# Patient Record
Sex: FEMALE | Race: WHITE | URBAN - METROPOLITAN AREA
[De-identification: names, ages, dates, MRNs, and addresses within clinical notes are randomized per-mention and may not be internally consistent; named-entity substitution may affect disease eponyms.]

---

## 2017-09-28 ENCOUNTER — OUTPATIENT (OUTPATIENT)
Dept: OUTPATIENT SERVICES | Facility: HOSPITAL | Age: 52
LOS: 1 days | Discharge: HOME | End: 2017-09-28

## 2017-09-28 DIAGNOSIS — Z12.31 ENCOUNTER FOR SCREENING MAMMOGRAM FOR MALIGNANT NEOPLASM OF BREAST: ICD-10-CM

## 2019-01-14 ENCOUNTER — OUTPATIENT (OUTPATIENT)
Dept: OUTPATIENT SERVICES | Facility: HOSPITAL | Age: 54
LOS: 1 days | Discharge: HOME | End: 2019-01-14

## 2019-01-14 DIAGNOSIS — Z12.31 ENCOUNTER FOR SCREENING MAMMOGRAM FOR MALIGNANT NEOPLASM OF BREAST: ICD-10-CM

## 2019-01-15 DIAGNOSIS — Z13.820 ENCOUNTER FOR SCREENING FOR OSTEOPOROSIS: ICD-10-CM

## 2019-01-15 DIAGNOSIS — N95.9 UNSPECIFIED MENOPAUSAL AND PERIMENOPAUSAL DISORDER: ICD-10-CM

## 2020-02-17 ENCOUNTER — EMERGENCY (EMERGENCY)
Facility: HOSPITAL | Age: 55
LOS: 0 days | Discharge: HOME | End: 2020-02-17
Attending: EMERGENCY MEDICINE | Admitting: EMERGENCY MEDICINE
Payer: COMMERCIAL

## 2020-02-17 VITALS
HEIGHT: 64 IN | SYSTOLIC BLOOD PRESSURE: 177 MMHG | TEMPERATURE: 98 F | RESPIRATION RATE: 18 BRPM | HEART RATE: 91 BPM | DIASTOLIC BLOOD PRESSURE: 91 MMHG | WEIGHT: 143.96 LBS | OXYGEN SATURATION: 99 %
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
## 2020-02-17 DIAGNOSIS — T16.1XXA FOREIGN BODY IN RIGHT EAR, INITIAL ENCOUNTER: ICD-10-CM

## 2020-02-17 DIAGNOSIS — T16.9XXA FOREIGN BODY IN EAR, UNSPECIFIED EAR, INITIAL ENCOUNTER: ICD-10-CM
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
## 2020-02-17 PROCEDURE — 99283 EMERGENCY DEPT VISIT LOW MDM: CPT

## 2020-02-17 RX ORDER — CIPROFLOXACIN/HYDROCORTISONE 0.2 %-1 %
3 SUSPENSION, DROPS(FINAL DOSAGE FORM)(ML) OTIC (EAR)
Qty: 1 | Refills: 0
Start: 2020-02-17 | End: 2020-02-23

## 2020-02-17 NOTE — ED ADULT TRIAGE NOTE - CHIEF COMPLAINT QUOTE
Patient states "I have a tissue stuck in my right ear from a few weeks ago doctor could not get it out".

## 2020-02-17 NOTE — ED PROVIDER NOTE - PHYSICAL EXAMINATION
--EXAM--  VITAL SIGNS: I have reviewed vs documented at present.  CONSTITUTIONAL: Well-developed; well-nourished; in no acute distress.   SKIN: Warm and dry, no acute rash.   HEAD: Normocephalic; atraumatic.  EYES: PERRL, EOM intact; conjunctiva and sclera clear. No nystagmus.  ENT: No nasal discharge; airway clear.  right ear foreign body seen can not see tm   NECK: Supple; non tender.  CARD: S1, S2, Regular rate and rhythm.   RESP: No wheezes, rales or rhonchi.  ABD: Normal bowel sounds; soft; non-distended; non-tender.  EXT: Normal ROM.   NEURO: Alert, oriented, grossly unremarkable. Strength 5/5 in all extremities. Sensation intact throughout.  PSYCH: Cooperative, appropriate.

## 2020-02-17 NOTE — ED PROVIDER NOTE - NS ED ROS FT
Review of Systems:  	•	CONSTITUTIONAL - no fever, no diaphoresis, no chills  	•	SKIN - no rash  	•	HEMATOLOGIC - no bleeding, no bruising  	•	EYES - no eye pain, no blurry vision  	•	ENT - no change in hearing, no sore throat, right  ear clogged  	•	RESPIRATORY - no shortness of breath, no cough  	•	CARDIAC - no chest pain, no palpitations  	  	•	MUSCULOSKELETAL - no joint paint, no swelling, no redness  	•	NEUROLOGIC - no weakness, no headache, no paresthesias, no LOC  	•	PSYCH - no anxiety, non suicidal, non homicidal, no hallucination, no depression

## 2020-02-17 NOTE — ED PROVIDER NOTE - CARE PROVIDER_API CALL
Mati Sam)  Otolaryngology  [address] [place] [address], [address] [place] [address]  Phone: [phone]  Fax: [phone]  Follow Up Time:

## 2020-02-17 NOTE — ED ADULT NURSE NOTE - NS_SISCREENINGSR_GEN_ALL_ED
[facility] Department    [address] [address]    Phone:  [phone]    Fax:  [phone]                                       Jenny Avina   MRN: [id number]    Department:  [facility]   Date of Visit:  11/3/2018           After Visit Summary Signature Page     I have received my discharge instructions, and my questions have been answered. I have discussed any challenges I see with this plan with the nurse or doctor.    ..........................................................................................................................................  Patient/Patient Representative Signature      ..........................................................................................................................................  Patient Representative Print Name and Relationship to Patient    ..................................................               ................................................  Date                                   Time    ..........................................................................................................................................  Reviewed by Signature/Title    ...................................................              ..............................................  Date                                               Time          22EPIC Rev 08/18         Negative

## 2020-02-17 NOTE — ED PROVIDER NOTE - PATIENT PORTAL LINK FT
You can access the FollowMyHealth Patient Portal offered by [facility] by registering at the following website: [email or web address]. By joining [employer]’s FollowMyHealth portal, you will also be able to view your health information using other applications (apps) compatible with our system.

## 2020-02-17 NOTE — ED PROVIDER NOTE - CLINICAL SUMMARY MEDICAL DECISION MAKING FREE TEXT BOX
54yF p/w FB to R ear, extracted at bedside, but canal irritation and ?TM would benefit from abx gtt.  Will prescribe abx and refer to ENT for f/u.  Return precautions reviewed with pt prior to dc.

## 2020-02-17 NOTE — ED PROVIDER NOTE - ATTENDING CONTRIBUTION TO CARE
54yF p/w FB to her R ear - pt c/o clogged feeling to her R ear for days to weeks and went to her PCP for evaluation. She admits to plugging her ears w/ tissues a while ago as makeshift ear plugs, so when her ear was persistently clogged, she tried to pick her ear to remove any remaining tissue.  Her PCP noted multiple pieces of whitish FB, but was unable to extract them 2/2 depth and sig cerumen.  He did note that she already had some irritation/bleeding from the wall of external canal and referred her to the ED rather than try further.  No fevers, ear pain or drainage.

## 2020-02-17 NOTE — ED PROVIDER NOTE - OBJECTIVE STATEMENT
this is 53 yo complaining if foreign body right ear. patient states she put something in her right ear to block noise. patient states she went to her pmd today because her ear was clogged. patient states pmd saw foreign body and was trying to get it out but was not successful. patient was sent to ed

## 2020-02-19 ENCOUNTER — APPOINTMENT (OUTPATIENT)
Dept: OTOLARYNGOLOGY | Facility: CLINIC | Age: 55
End: 2020-02-19
Payer: COMMERCIAL

## 2020-02-19 VITALS — BODY MASS INDEX: 24.24 KG/M2 | WEIGHT: 142 LBS | HEIGHT: 64 IN

## 2020-02-19 DIAGNOSIS — R09.81 NASAL CONGESTION: ICD-10-CM

## 2020-02-19 DIAGNOSIS — Z78.9 OTHER SPECIFIED HEALTH STATUS: ICD-10-CM

## 2020-02-19 DIAGNOSIS — H61.22 IMPACTED CERUMEN, LEFT EAR: ICD-10-CM

## 2020-02-19 DIAGNOSIS — T16.1XXA FOREIGN BODY IN RIGHT EAR, INITIAL ENCOUNTER: ICD-10-CM

## 2020-02-19 PROBLEM — Z00.00 ENCOUNTER FOR PREVENTIVE HEALTH EXAMINATION: Status: ACTIVE | Noted: 2020-02-19

## 2020-02-19 PROCEDURE — 69200 CLEAR OUTER EAR CANAL: CPT | Mod: RT

## 2020-02-19 PROCEDURE — 99204 OFFICE O/P NEW MOD 45 MIN: CPT | Mod: 25

## 2020-02-19 PROCEDURE — 69210 REMOVE IMPACTED EAR WAX UNI: CPT | Mod: 59

## 2020-02-19 RX ORDER — METHYLPREDNISOLONE 4 MG/1
4 TABLET ORAL
Qty: 1 | Refills: 0 | Status: ACTIVE | COMMUNITY
Start: 2020-02-19 | End: 1900-01-01

## 2020-02-19 RX ORDER — LEVOCETIRIZINE DIHYDROCHLORIDE 5 MG/1
5 TABLET ORAL DAILY
Qty: 30 | Refills: 1 | Status: ACTIVE | COMMUNITY
Start: 2020-02-19 | End: 1900-01-01

## 2020-02-19 NOTE — PHYSICAL EXAM
[de-identified] : right tissue, left cerumen  [Midline] : trachea located in midline position [Normal] : no rashes

## 2020-02-19 NOTE — HISTORY OF PRESENT ILLNESS
[de-identified] : Patient here today due to foreign body in right ear. Patient was seen in the ER Monday night. She was told most of it was out but she now c/o hearing loss in her  right ear. She states she stuck tissues in her ears about 2 weeks ago due to loud noises. Pt was given drops. \par \par Pt has facial, eye and nasal pressure. Pt has frontal headaches and congestion. Pt feels like in a tunnel. Pt has been on a nasal spray for two days. Pt has used claritin with minimal improvement.

## 2020-03-04 PROBLEM — Z78.9 OTHER SPECIFIED HEALTH STATUS: Chronic | Status: ACTIVE | Noted: 2020-02-17

## 2020-03-31 ENCOUNTER — APPOINTMENT (OUTPATIENT)
Dept: UROLOGY | Facility: CLINIC | Age: 55
End: 2020-03-31
Payer: COMMERCIAL

## 2020-03-31 VITALS
DIASTOLIC BLOOD PRESSURE: 89 MMHG | TEMPERATURE: 98.1 F | SYSTOLIC BLOOD PRESSURE: 138 MMHG | BODY MASS INDEX: 24.24 KG/M2 | HEART RATE: 76 BPM | WEIGHT: 142 LBS | HEIGHT: 64 IN

## 2020-03-31 PROCEDURE — 99203 OFFICE O/P NEW LOW 30 MIN: CPT

## 2020-03-31 NOTE — REVIEW OF SYSTEMS
[Feeling Poorly] : not feeling poorly [Chest Pain] : no chest pain [Shortness Of Breath] : no shortness of breath [Cough] : no cough [Constipation] : no constipation [Diarrhea] : no diarrhea [Confused] : no confusion

## 2020-03-31 NOTE — PHYSICAL EXAM
[General Appearance - In No Acute Distress] : no acute distress [Edema] : no peripheral edema [] : no respiratory distress [Abdomen Soft] : soft [Abdomen Tenderness] : non-tender [Costovertebral Angle Tenderness] : no ~M costovertebral angle tenderness [Normal Station and Gait] : the gait and station were normal for the patient's age [Oriented To Time, Place, And Person] : oriented to person, place, and time

## 2020-04-01 LAB — URINE CYTOLOGY: NORMAL

## 2020-04-02 ENCOUNTER — RESULT REVIEW (OUTPATIENT)
Age: 55
End: 2020-04-02

## 2020-04-02 ENCOUNTER — OUTPATIENT (OUTPATIENT)
Dept: OUTPATIENT SERVICES | Facility: HOSPITAL | Age: 55
LOS: 1 days | Discharge: HOME | End: 2020-04-02
Payer: COMMERCIAL

## 2020-04-02 DIAGNOSIS — R31.29 OTHER MICROSCOPIC HEMATURIA: ICD-10-CM

## 2020-04-02 PROCEDURE — 74178 CT ABD&PLV WO CNTR FLWD CNTR: CPT | Mod: 26

## 2020-04-03 ENCOUNTER — RECORD ABSTRACTING (OUTPATIENT)
Age: 55
End: 2020-04-03

## 2020-06-24 ENCOUNTER — OUTPATIENT (OUTPATIENT)
Dept: OUTPATIENT SERVICES | Facility: HOSPITAL | Age: 55
LOS: 1 days | Discharge: HOME | End: 2020-06-24
Payer: COMMERCIAL

## 2020-06-24 DIAGNOSIS — Z12.31 ENCOUNTER FOR SCREENING MAMMOGRAM FOR MALIGNANT NEOPLASM OF BREAST: ICD-10-CM

## 2020-06-24 PROCEDURE — 77063 BREAST TOMOSYNTHESIS BI: CPT | Mod: 26

## 2020-06-24 PROCEDURE — 77067 SCR MAMMO BI INCL CAD: CPT | Mod: 26

## 2020-06-30 ENCOUNTER — APPOINTMENT (OUTPATIENT)
Dept: UROLOGY | Facility: CLINIC | Age: 55
End: 2020-06-30
Payer: COMMERCIAL

## 2020-06-30 VITALS — WEIGHT: 142 LBS | TEMPERATURE: 98.2 F | HEIGHT: 64 IN | BODY MASS INDEX: 24.24 KG/M2

## 2020-06-30 PROCEDURE — 52000 CYSTOURETHROSCOPY: CPT

## 2020-10-06 ENCOUNTER — APPOINTMENT (OUTPATIENT)
Dept: UROLOGY | Facility: CLINIC | Age: 55
End: 2020-10-06
Payer: COMMERCIAL

## 2020-10-06 DIAGNOSIS — R31.29 OTHER MICROSCOPIC HEMATURIA: ICD-10-CM

## 2020-10-06 LAB
BILIRUB UR QL STRIP: NORMAL
COLLECTION METHOD: NORMAL
GLUCOSE UR-MCNC: NORMAL
HCG UR QL: NORMAL EU/DL
HGB UR QL STRIP.AUTO: NORMAL
KETONES UR-MCNC: NORMAL
LEUKOCYTE ESTERASE UR QL STRIP: NORMAL
NITRITE UR QL STRIP: NORMAL
PH UR STRIP: NORMAL
PROT UR STRIP-MCNC: NORMAL
SP GR UR STRIP: 1

## 2020-10-06 PROCEDURE — 99213 OFFICE O/P EST LOW 20 MIN: CPT

## 2020-10-06 PROCEDURE — 81003 URINALYSIS AUTO W/O SCOPE: CPT | Mod: QW

## 2020-10-06 NOTE — HISTORY OF PRESENT ILLNESS
[FreeTextEntry1] : History of microscopic hematuria status post benign workup. Today's urinalysis is negative for red blood cells.  no urinary complaints [Urinary Incontinence] : no urinary incontinence [Urinary Retention] : no urinary retention [Urinary Urgency] : no urinary urgency [Urinary Frequency] : no urinary frequency [Dysuria] : no dysuria [Hematuria - Gross] : no gross hematuria

## 2020-10-06 NOTE — REVIEW OF SYSTEMS
[Feeling Poorly] : not feeling poorly [Feeling Tired] : not feeling tired [Chest Pain] : no chest pain [Cough] : no cough [Constipation] : no constipation [Pelvic Pain] : no pelvic pain [Confused] : no confusion

## 2020-10-06 NOTE — PHYSICAL EXAM
[General Appearance - In No Acute Distress] : no acute distress [Costovertebral Angle Tenderness] : no ~M costovertebral angle tenderness [] : no respiratory distress [Oriented To Time, Place, And Person] : oriented to person, place, and time [Normal Station and Gait] : the gait and station were normal for the patient's age

## 2021-06-17 NOTE — ED ADULT NURSE NOTE - TEMPLATE
EENMT Double Island Pedicle Flap Text: The defect edges were debeveled with a #15 scalpel blade.  Given the location of the defect, shape of the defect and the proximity to free margins a double island pedicle advancement flap was deemed most appropriate.  Using a sterile surgical marker, an appropriate advancement flap was drawn incorporating the defect, outlining the appropriate donor tissue and placing the expected incisions within the relaxed skin tension lines where possible.    The area thus outlined was incised deep to adipose tissue with a #15 scalpel blade.  The skin margins were undermined to an appropriate distance in all directions around the primary defect and laterally outward around the island pedicle utilizing iris scissors.  There was minimal undermining beneath the pedicle flap.

## 2021-07-14 ENCOUNTER — NON-APPOINTMENT (OUTPATIENT)
Age: 56
End: 2021-07-14

## 2021-08-01 ENCOUNTER — LABORATORY RESULT (OUTPATIENT)
Age: 56
End: 2021-08-01

## 2021-08-02 ENCOUNTER — APPOINTMENT (OUTPATIENT)
Dept: OBGYN | Facility: CLINIC | Age: 56
End: 2021-08-02
Payer: COMMERCIAL

## 2021-08-02 VITALS
WEIGHT: 144 LBS | BODY MASS INDEX: 24.59 KG/M2 | SYSTOLIC BLOOD PRESSURE: 135 MMHG | HEIGHT: 64 IN | DIASTOLIC BLOOD PRESSURE: 90 MMHG

## 2021-08-02 DIAGNOSIS — R82.998 OTHER ABNORMAL FINDINGS IN URINE: ICD-10-CM

## 2021-08-02 DIAGNOSIS — Z80.0 FAMILY HISTORY OF MALIGNANT NEOPLASM OF DIGESTIVE ORGANS: ICD-10-CM

## 2021-08-02 DIAGNOSIS — G43.909 MIGRAINE, UNSPECIFIED, NOT INTRACTABLE, W/OUT STATUS MIGRAINOSUS: ICD-10-CM

## 2021-08-02 DIAGNOSIS — Z80.6 FAMILY HISTORY OF LEUKEMIA: ICD-10-CM

## 2021-08-02 DIAGNOSIS — R31.21 ASYMPTOMATIC MICROSCOPIC HEMATURIA: ICD-10-CM

## 2021-08-02 DIAGNOSIS — Z01.411 ENCOUNTER FOR GYNECOLOGICAL EXAMINATION (GENERAL) (ROUTINE) WITH ABNORMAL FINDINGS: ICD-10-CM

## 2021-08-02 LAB
BILIRUB UR QL STRIP: NORMAL
CLARITY UR: NORMAL
COLLECTION METHOD: NORMAL
GLUCOSE UR-MCNC: NORMAL
HCG UR QL: 0.2 EU/DL
HGB UR QL STRIP.AUTO: NORMAL
KETONES UR-MCNC: NORMAL
LEUKOCYTE ESTERASE UR QL STRIP: NORMAL
NITRITE UR QL STRIP: NORMAL
PH UR STRIP: 5.5
PROT UR STRIP-MCNC: NORMAL
SP GR UR STRIP: 1.03

## 2021-08-02 PROCEDURE — 99396 PREV VISIT EST AGE 40-64: CPT

## 2021-08-02 PROCEDURE — 81003 URINALYSIS AUTO W/O SCOPE: CPT | Mod: QW

## 2021-08-02 NOTE — PHYSICAL EXAM
[Examination Of The Breasts] : a normal appearance [] : implants [No Masses] : no breast masses were palpable [Vulvar Atrophy] : vulvar atrophy [Labia Majora] : normal [Labia Minora] : normal [Atrophy] : atrophy [Normal] : normal [Retroversion] : retroverted [Uterine Adnexae] : normal

## 2021-08-02 NOTE — HISTORY OF PRESENT ILLNESS
[Menopause Age: ____] : age at menopause was [unfilled] [postmenopausal] : postmenopausal [N] : Patient does not use contraception [Currently In Menopause] : currently in menopause [No] : Patient does not have concerns regarding sex

## 2021-08-04 ENCOUNTER — RESULT REVIEW (OUTPATIENT)
Age: 56
End: 2021-08-04

## 2021-08-04 ENCOUNTER — OUTPATIENT (OUTPATIENT)
Dept: OUTPATIENT SERVICES | Facility: HOSPITAL | Age: 56
LOS: 1 days | Discharge: HOME | End: 2021-08-04
Payer: COMMERCIAL

## 2021-08-04 DIAGNOSIS — Z12.31 ENCOUNTER FOR SCREENING MAMMOGRAM FOR MALIGNANT NEOPLASM OF BREAST: ICD-10-CM

## 2021-08-04 LAB
C TRACH RRNA SPEC QL NAA+PROBE: NOT DETECTED
HPV HIGH+LOW RISK DNA PNL CVX: NOT DETECTED
N GONORRHOEA RRNA SPEC QL NAA+PROBE: NOT DETECTED
SOURCE AMPLIFICATION: NORMAL

## 2021-08-04 PROCEDURE — 77063 BREAST TOMOSYNTHESIS BI: CPT | Mod: 26

## 2021-08-04 PROCEDURE — 77067 SCR MAMMO BI INCL CAD: CPT | Mod: 26

## 2021-08-05 DIAGNOSIS — R92.2 INCONCLUSIVE MAMMOGRAM: ICD-10-CM

## 2021-08-05 DIAGNOSIS — R92.8 OTHER ABNORMAL AND INCONCLUSIVE FINDINGS ON DIAGNOSTIC IMAGING OF BREAST: ICD-10-CM

## 2021-08-10 ENCOUNTER — NON-APPOINTMENT (OUTPATIENT)
Age: 56
End: 2021-08-10

## 2021-08-10 DIAGNOSIS — N39.0 URINARY TRACT INFECTION, SITE NOT SPECIFIED: ICD-10-CM

## 2021-08-10 RX ORDER — CIPROFLOXACIN HYDROCHLORIDE 250 MG/1
250 TABLET, FILM COATED ORAL
Qty: 6 | Refills: 0 | Status: ACTIVE | COMMUNITY
Start: 2021-08-10 | End: 1900-01-01

## 2021-08-13 ENCOUNTER — RESULT REVIEW (OUTPATIENT)
Age: 56
End: 2021-08-13

## 2021-08-13 ENCOUNTER — OUTPATIENT (OUTPATIENT)
Dept: OUTPATIENT SERVICES | Facility: HOSPITAL | Age: 56
LOS: 1 days | Discharge: HOME | End: 2021-08-13
Payer: COMMERCIAL

## 2021-08-13 DIAGNOSIS — R92.8 OTHER ABNORMAL AND INCONCLUSIVE FINDINGS ON DIAGNOSTIC IMAGING OF BREAST: ICD-10-CM

## 2021-08-13 PROCEDURE — 77065 DX MAMMO INCL CAD UNI: CPT | Mod: 26,RT

## 2021-08-13 PROCEDURE — G0279: CPT | Mod: 26

## 2021-08-13 PROCEDURE — 76642 ULTRASOUND BREAST LIMITED: CPT | Mod: 26,RT

## 2021-08-14 LAB
APPEARANCE: ABNORMAL
BACTERIA UR CULT: ABNORMAL
BILIRUBIN URINE: NEGATIVE
BLOOD URINE: ABNORMAL
COLOR: YELLOW
CYTOLOGY CVX/VAG DOC THIN PREP: ABNORMAL
GLUCOSE QUALITATIVE U: NEGATIVE
KETONES URINE: ABNORMAL
LEUKOCYTE ESTERASE URINE: ABNORMAL
NITRITE URINE: NEGATIVE
PH URINE: 5.5
PROTEIN URINE: NORMAL
SPECIFIC GRAVITY URINE: 1.03
UROBILINOGEN URINE: NORMAL

## 2021-08-26 ENCOUNTER — RESULT REVIEW (OUTPATIENT)
Age: 56
End: 2021-08-26

## 2021-08-26 ENCOUNTER — OUTPATIENT (OUTPATIENT)
Dept: OUTPATIENT SERVICES | Facility: HOSPITAL | Age: 56
LOS: 1 days | Discharge: HOME | End: 2021-08-26
Payer: COMMERCIAL

## 2021-08-26 PROCEDURE — 88305 TISSUE EXAM BY PATHOLOGIST: CPT | Mod: 26

## 2021-08-26 PROCEDURE — 19083 BX BREAST 1ST LESION US IMAG: CPT | Mod: RT

## 2021-08-26 PROCEDURE — 77065 DX MAMMO INCL CAD UNI: CPT | Mod: 26,RT

## 2021-08-27 LAB — SURGICAL PATHOLOGY STUDY: SIGNIFICANT CHANGE UP

## 2021-08-31 DIAGNOSIS — N63.10 UNSPECIFIED LUMP IN THE RIGHT BREAST, UNSPECIFIED QUADRANT: ICD-10-CM

## 2021-08-31 DIAGNOSIS — R92.1 MAMMOGRAPHIC CALCIFICATION FOUND ON DIAGNOSTIC IMAGING OF BREAST: ICD-10-CM

## 2021-09-22 ENCOUNTER — NON-APPOINTMENT (OUTPATIENT)
Age: 56
End: 2021-09-22

## 2022-06-21 PROBLEM — Z00.00 ENCOUNTER FOR PREVENTIVE HEALTH EXAMINATION: Status: ACTIVE | Noted: 2022-06-21

## 2022-09-01 ENCOUNTER — OUTPATIENT (OUTPATIENT)
Dept: OUTPATIENT SERVICES | Facility: HOSPITAL | Age: 57
LOS: 1 days | Discharge: HOME | End: 2022-09-01

## 2022-09-01 DIAGNOSIS — Z12.31 ENCOUNTER FOR SCREENING MAMMOGRAM FOR MALIGNANT NEOPLASM OF BREAST: ICD-10-CM

## 2022-09-01 PROCEDURE — 77067 SCR MAMMO BI INCL CAD: CPT | Mod: 26

## 2022-09-01 PROCEDURE — 77063 BREAST TOMOSYNTHESIS BI: CPT | Mod: 26

## 2023-09-05 ENCOUNTER — OUTPATIENT (OUTPATIENT)
Dept: OUTPATIENT SERVICES | Facility: HOSPITAL | Age: 58
LOS: 1 days | End: 2023-09-05
Payer: COMMERCIAL

## 2023-09-05 DIAGNOSIS — Z12.31 ENCOUNTER FOR SCREENING MAMMOGRAM FOR MALIGNANT NEOPLASM OF BREAST: ICD-10-CM

## 2023-09-05 PROCEDURE — 77067 SCR MAMMO BI INCL CAD: CPT

## 2023-09-05 PROCEDURE — 77067 SCR MAMMO BI INCL CAD: CPT | Mod: 26

## 2023-09-05 PROCEDURE — 77063 BREAST TOMOSYNTHESIS BI: CPT | Mod: 26

## 2023-09-05 PROCEDURE — 77063 BREAST TOMOSYNTHESIS BI: CPT

## 2023-09-06 DIAGNOSIS — Z12.31 ENCOUNTER FOR SCREENING MAMMOGRAM FOR MALIGNANT NEOPLASM OF BREAST: ICD-10-CM

## 2023-09-27 ENCOUNTER — NON-APPOINTMENT (OUTPATIENT)
Age: 58
End: 2023-09-27

## 2023-11-17 NOTE — ASSESSMENT
[FreeTextEntry1] : Microscopic hematuria maybe deflect benign essential hematuria but first must rule out more serious causes for example stones tumors therefore we'll or urine cytology, CT urography. This benefits and alternatives discussed regarding an anaphylactoid reaction from IV contrast. Flexible cystoscopy in the future when this covid19 crisis wanes
Vaginal Delivery

## 2024-03-27 ENCOUNTER — OUTPATIENT (OUTPATIENT)
Dept: OUTPATIENT SERVICES | Facility: HOSPITAL | Age: 59
LOS: 1 days | Discharge: HOME | End: 2024-03-27
Payer: COMMERCIAL

## 2024-03-27 VITALS
HEART RATE: 72 BPM | WEIGHT: 139.99 LBS | RESPIRATION RATE: 18 BRPM | SYSTOLIC BLOOD PRESSURE: 139 MMHG | HEIGHT: 64 IN | OXYGEN SATURATION: 99 % | DIASTOLIC BLOOD PRESSURE: 89 MMHG | TEMPERATURE: 97 F

## 2024-03-27 VITALS — DIASTOLIC BLOOD PRESSURE: 87 MMHG | RESPIRATION RATE: 17 BRPM | SYSTOLIC BLOOD PRESSURE: 135 MMHG | HEART RATE: 67 BPM

## 2024-03-27 DIAGNOSIS — Z98.51 TUBAL LIGATION STATUS: Chronic | ICD-10-CM

## 2024-03-27 DIAGNOSIS — H25.12 AGE-RELATED NUCLEAR CATARACT, LEFT EYE: ICD-10-CM

## 2024-03-27 DIAGNOSIS — Z90.49 ACQUIRED ABSENCE OF OTHER SPECIFIED PARTS OF DIGESTIVE TRACT: Chronic | ICD-10-CM

## 2024-03-27 PROCEDURE — V2632: CPT

## 2024-03-27 PROCEDURE — 92499 UNLISTED OPH SVC/PROCEDURE: CPT

## 2024-03-27 PROCEDURE — V2788: CPT

## 2024-03-27 NOTE — ASU PATIENT PROFILE, ADULT - FALL HARM RISK - HARM RISK INTERVENTIONS

## 2024-03-27 NOTE — ASU PATIENT PROFILE, ADULT - AS SC BRADEN FRICTION
"Spoke to patient's daughter in regards to the skin discoloration to her fingers and toes.  Denies pain with the color changes, but she notes that they are "turning black".    Ms. Fritz notes that Ms. Pederson's BG are 90-130s (FBG ~100s).  Discussed that patient's BG levels are at goal, and no recommended changes to januvia at this time.    Discoloration to fingers and toes are concerning for circulatory problems.  Recommended that patient call her cardiologist (provided name) as she has an upcoming appointment, and they may want to run further testing prior to clinic appointment.    However, I discussed with patient if she is unable to get in touch with cardiology or PCP for further recommendations, I recommended that she bring her mother to an urgent care or ED for a more urgent evaluation.    Ms. Fritz v/licha.    Shelby Horner MD  Endocrinology Fellow     " (3) no apparent problem

## 2024-03-27 NOTE — PRE-ANESTHESIA EVALUATION ADULT - NSANTHAIRWAYFT_ENT_ALL_CORE
wnl Erythromycin Pregnancy And Lactation Text: This medication is Pregnancy Category B and is considered safe during pregnancy. It is also excreted in breast milk.

## 2024-04-03 DIAGNOSIS — H25.812 COMBINED FORMS OF AGE-RELATED CATARACT, LEFT EYE: ICD-10-CM

## 2024-04-03 DIAGNOSIS — Z88.0 ALLERGY STATUS TO PENICILLIN: ICD-10-CM

## 2024-04-03 DIAGNOSIS — I10 ESSENTIAL (PRIMARY) HYPERTENSION: ICD-10-CM

## 2024-04-03 DIAGNOSIS — Z83.511 FAMILY HISTORY OF GLAUCOMA: ICD-10-CM

## 2024-04-03 DIAGNOSIS — H52.202 UNSPECIFIED ASTIGMATISM, LEFT EYE: ICD-10-CM

## 2024-04-03 DIAGNOSIS — F17.210 NICOTINE DEPENDENCE, CIGARETTES, UNCOMPLICATED: ICD-10-CM

## 2024-04-10 ENCOUNTER — TRANSCRIPTION ENCOUNTER (OUTPATIENT)
Age: 59
End: 2024-04-10

## 2024-04-10 ENCOUNTER — OUTPATIENT (OUTPATIENT)
Dept: OUTPATIENT SERVICES | Facility: HOSPITAL | Age: 59
LOS: 1 days | Discharge: HOME | End: 2024-04-10
Payer: COMMERCIAL

## 2024-04-10 VITALS
DIASTOLIC BLOOD PRESSURE: 82 MMHG | HEART RATE: 72 BPM | HEIGHT: 64 IN | RESPIRATION RATE: 17 BRPM | WEIGHT: 139.99 LBS | SYSTOLIC BLOOD PRESSURE: 127 MMHG | TEMPERATURE: 96 F | OXYGEN SATURATION: 100 %

## 2024-04-10 VITALS — HEART RATE: 60 BPM | SYSTOLIC BLOOD PRESSURE: 139 MMHG | DIASTOLIC BLOOD PRESSURE: 90 MMHG | RESPIRATION RATE: 18 BRPM

## 2024-04-10 DIAGNOSIS — Z98.51 TUBAL LIGATION STATUS: Chronic | ICD-10-CM

## 2024-04-10 DIAGNOSIS — F41.9 ANXIETY DISORDER, UNSPECIFIED: ICD-10-CM

## 2024-04-10 DIAGNOSIS — F32.A DEPRESSION, UNSPECIFIED: ICD-10-CM

## 2024-04-10 DIAGNOSIS — F17.210 NICOTINE DEPENDENCE, CIGARETTES, UNCOMPLICATED: ICD-10-CM

## 2024-04-10 DIAGNOSIS — H52.201 UNSPECIFIED ASTIGMATISM, RIGHT EYE: ICD-10-CM

## 2024-04-10 DIAGNOSIS — Z98.82 BREAST IMPLANT STATUS: Chronic | ICD-10-CM

## 2024-04-10 DIAGNOSIS — H25.811 COMBINED FORMS OF AGE-RELATED CATARACT, RIGHT EYE: ICD-10-CM

## 2024-04-10 DIAGNOSIS — Z98.42 CATARACT EXTRACTION STATUS, LEFT EYE: Chronic | ICD-10-CM

## 2024-04-10 DIAGNOSIS — Z90.49 ACQUIRED ABSENCE OF OTHER SPECIFIED PARTS OF DIGESTIVE TRACT: Chronic | ICD-10-CM

## 2024-04-10 DIAGNOSIS — Z88.0 ALLERGY STATUS TO PENICILLIN: ICD-10-CM

## 2024-04-10 DIAGNOSIS — I10 ESSENTIAL (PRIMARY) HYPERTENSION: ICD-10-CM

## 2024-04-10 DIAGNOSIS — H52.4 PRESBYOPIA: ICD-10-CM

## 2024-04-10 PROCEDURE — 66999 UNLISTED PX ANT SEGMENT EYE: CPT

## 2024-04-10 PROCEDURE — V2788: CPT

## 2024-04-10 PROCEDURE — 92499 UNLISTED OPH SVC/PROCEDURE: CPT

## 2024-04-10 PROCEDURE — V2632: CPT

## 2024-04-10 NOTE — ASU DISCHARGE PLAN (ADULT/PEDIATRIC) - NS MD DC FALL RISK RISK
For information on Fall & Injury Prevention, visit: [email or web address] OR  [email or web address] OR  https://www.cdc.gov/steadi/patient.html

## 2024-04-10 NOTE — ASU PATIENT PROFILE, ADULT - NSICDXPASTSURGICALHX_GEN_ALL_CORE_FT
PAST SURGICAL HISTORY:  H/O breast implant     H/O tubal ligation     S/P appendectomy     Status post cataract extraction and insertion of intraocular lens of left eye

## 2024-09-09 ENCOUNTER — OUTPATIENT (OUTPATIENT)
Dept: OUTPATIENT SERVICES | Facility: HOSPITAL | Age: 59
LOS: 1 days | End: 2024-09-09
Payer: COMMERCIAL

## 2024-09-09 DIAGNOSIS — Z98.51 TUBAL LIGATION STATUS: Chronic | ICD-10-CM

## 2024-09-09 DIAGNOSIS — Z12.31 ENCOUNTER FOR SCREENING MAMMOGRAM FOR MALIGNANT NEOPLASM OF BREAST: ICD-10-CM

## 2024-09-09 DIAGNOSIS — Z98.42 CATARACT EXTRACTION STATUS, LEFT EYE: Chronic | ICD-10-CM

## 2024-09-09 DIAGNOSIS — Z98.82 BREAST IMPLANT STATUS: Chronic | ICD-10-CM

## 2024-09-09 DIAGNOSIS — Z90.49 ACQUIRED ABSENCE OF OTHER SPECIFIED PARTS OF DIGESTIVE TRACT: Chronic | ICD-10-CM

## 2024-09-09 PROCEDURE — 77063 BREAST TOMOSYNTHESIS BI: CPT | Mod: 26

## 2024-09-09 PROCEDURE — 77063 BREAST TOMOSYNTHESIS BI: CPT

## 2024-09-09 PROCEDURE — 77067 SCR MAMMO BI INCL CAD: CPT

## 2024-09-09 PROCEDURE — 77067 SCR MAMMO BI INCL CAD: CPT | Mod: 26

## 2024-09-10 DIAGNOSIS — Z12.31 ENCOUNTER FOR SCREENING MAMMOGRAM FOR MALIGNANT NEOPLASM OF BREAST: ICD-10-CM

## 2025-05-16 ENCOUNTER — NON-APPOINTMENT (OUTPATIENT)
Age: 60
End: 2025-05-16